# Patient Record
Sex: FEMALE | Race: WHITE | NOT HISPANIC OR LATINO | URBAN - METROPOLITAN AREA
[De-identification: names, ages, dates, MRNs, and addresses within clinical notes are randomized per-mention and may not be internally consistent; named-entity substitution may affect disease eponyms.]

---
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
## 2022-05-27 NOTE — ED BEHAVIORAL HEALTH ASSESSMENT NOTE - NS ED B[facility] ALCOHOL
Dr. Santoyo unable to access Epic. Contacted IT to fix the problem. However he has surgery at 2pm    Notes will be available next week.    None known

## 2022-10-03 NOTE — ED BEHAVIORAL HEALTH ASSESSMENT NOTE - ADULT OR CHILD PROTECTIVE SERVICES INVOLVEMENT
Pt given injection and waited 1 hour and discharged with stable vitals and discharge instructions. No

## 2023-09-25 NOTE — ED BEHAVIORAL HEALTH ASSESSMENT NOTE - NS ED B[facility] BENZODIAZEPINES
Care Due:                  Date            Visit Type   Department     Provider  --------------------------------------------------------------------------------                                [facility]  Last Visit: 11-      AUDIO ONLY   PRIMARY CARE   Orestes Hancock  Next Visit: None Scheduled  None         None Found                                                            Last  Test          Frequency    Reason                     Performed    Due Date  --------------------------------------------------------------------------------    Office Visit  12 months..  citalopram, furosemide,    11- 11-                             losartan.................    CMP.........  12 months..  furosemide, losartan.....  08- 08-    [facility] Embedded Care Due Messages. Reference number: [id number].   9/25/2023 8:01:41 AM CDT   None known